# Patient Record
Sex: FEMALE | Race: WHITE | URBAN - METROPOLITAN AREA
[De-identification: names, ages, dates, MRNs, and addresses within clinical notes are randomized per-mention and may not be internally consistent; named-entity substitution may affect disease eponyms.]

---

## 2017-12-08 ENCOUNTER — HOSPITAL ENCOUNTER (OUTPATIENT)
Dept: SURGERY | Age: 42
Discharge: HOME | End: 2017-12-08
Attending: PODIATRIST | Admitting: PODIATRIST

## 2017-12-08 VITALS
HEART RATE: 52 BPM | SYSTOLIC BLOOD PRESSURE: 117 MMHG | DIASTOLIC BLOOD PRESSURE: 70 MMHG | OXYGEN SATURATION: 99 % | BODY MASS INDEX: 24.59 KG/M2 | WEIGHT: 166 LBS | TEMPERATURE: 97.3 F | RESPIRATION RATE: 18 BRPM | HEIGHT: 69 IN

## 2017-12-08 DIAGNOSIS — M21.612 BUNION, LEFT FOOT: ICD-10-CM

## 2017-12-08 LAB — PREGNANCY, URINE: NEGATIVE

## 2017-12-08 RX ORDER — HYDROCODONE BITARTRATE AND ACETAMINOPHEN 5; 325 MG/1; MG/1
1 TABLET ORAL
Status: ACTIVE | OUTPATIENT
Start: 2017-12-08 | End: 2017-12-08

## 2017-12-08 RX ORDER — LABETALOL HYDROCHLORIDE 5 MG/ML
5 INJECTION, SOLUTION INTRAVENOUS EVERY 10 MIN PRN
Status: DISCONTINUED | OUTPATIENT
Start: 2017-12-08 | End: 2017-12-09 | Stop reason: HOSPADM

## 2017-12-08 RX ORDER — SODIUM CHLORIDE 0.9 % (FLUSH) 0.9 %
10 SYRINGE (ML) INJECTION PRN
Status: DISCONTINUED | OUTPATIENT
Start: 2017-12-08 | End: 2017-12-09 | Stop reason: HOSPADM

## 2017-12-08 RX ORDER — PROMETHAZINE HYDROCHLORIDE 25 MG/ML
6.25 INJECTION, SOLUTION INTRAMUSCULAR; INTRAVENOUS
Status: ACTIVE | OUTPATIENT
Start: 2017-12-08 | End: 2017-12-08

## 2017-12-08 RX ORDER — SODIUM CHLORIDE 0.9 % (FLUSH) 0.9 %
10 SYRINGE (ML) INJECTION EVERY 12 HOURS SCHEDULED
Status: DISCONTINUED | OUTPATIENT
Start: 2017-12-08 | End: 2017-12-09 | Stop reason: HOSPADM

## 2017-12-08 RX ORDER — FENTANYL CITRATE 50 UG/ML
25 INJECTION, SOLUTION INTRAMUSCULAR; INTRAVENOUS EVERY 5 MIN PRN
Status: DISCONTINUED | OUTPATIENT
Start: 2017-12-08 | End: 2017-12-09 | Stop reason: HOSPADM

## 2017-12-08 RX ORDER — HYDROMORPHONE HCL 110MG/55ML
0.5 PATIENT CONTROLLED ANALGESIA SYRINGE INTRAVENOUS EVERY 5 MIN PRN
Status: DISCONTINUED | OUTPATIENT
Start: 2017-12-08 | End: 2017-12-09 | Stop reason: HOSPADM

## 2017-12-08 RX ORDER — SODIUM CHLORIDE 9 MG/ML
INJECTION, SOLUTION INTRAVENOUS CONTINUOUS
Status: DISCONTINUED | OUTPATIENT
Start: 2017-12-08 | End: 2017-12-09 | Stop reason: HOSPADM

## 2017-12-08 RX ADMIN — SODIUM CHLORIDE: 9 INJECTION, SOLUTION INTRAVENOUS at 11:47

## 2017-12-08 RX ADMIN — FENTANYL CITRATE 25 MCG: 50 INJECTION, SOLUTION INTRAMUSCULAR; INTRAVENOUS at 15:10

## 2017-12-08 ASSESSMENT — PAIN DESCRIPTION - PROGRESSION
CLINICAL_PROGRESSION: NOT CHANGED
CLINICAL_PROGRESSION: NOT CHANGED

## 2017-12-08 ASSESSMENT — PAIN DESCRIPTION - LOCATION
LOCATION: FOOT
LOCATION: FOOT

## 2017-12-08 ASSESSMENT — PAIN SCALES - GENERAL
PAINLEVEL_OUTOF10: 4
PAINLEVEL_OUTOF10: 5
PAINLEVEL_OUTOF10: 5
PAINLEVEL_OUTOF10: 4

## 2017-12-08 ASSESSMENT — PAIN DESCRIPTION - FREQUENCY
FREQUENCY: CONTINUOUS
FREQUENCY: CONTINUOUS

## 2017-12-08 ASSESSMENT — PAIN DESCRIPTION - PAIN TYPE
TYPE: SURGICAL PAIN
TYPE: SURGICAL PAIN

## 2017-12-08 ASSESSMENT — PAIN DESCRIPTION - DESCRIPTORS
DESCRIPTORS: ACHING
DESCRIPTORS: ACHING

## 2017-12-08 ASSESSMENT — PAIN DESCRIPTION - ORIENTATION
ORIENTATION: LEFT
ORIENTATION: LEFT

## 2017-12-08 NOTE — ANESTHESIA PRE-OP
[facility] Department of Anesthesiology  Pre-Anesthesia Evaluation/Consultation       Name:  Marquez Graham  : 1975  Age:  42 y.o. MRN:  [id number]  Date: 2017           Procedure (Scheduled): Modified deon akin osteotomy first metatarsal left foot, distal metatarsal osteotomy second metatarsal left foot, tailors bunionectomy left foot  Surgeon:  Dr. Cuellar     No Known Allergies  There is no problem list on file for this patient. Past Medical History:   Diagnosis Date    Anxiety     Depression     GERD (gastroesophageal reflux disease)     PAC (premature atrial contraction)     PVC (premature ventricular contraction)      Past Surgical History:   Procedure Laterality Date    BREAST SURGERY      implants    ENDOSCOPY, COLON, DIAGNOSTIC      HERNIA REPAIR      umbilical     Social History   Substance Use Topics    Smoking status: Never Smoker    Smokeless tobacco: Never Used    Alcohol use 2.4 oz/week     4 Glasses of wine per week     Medications  Current Outpatient Prescriptions on File Prior to Encounter   Medication Sig Dispense Refill    clonazePAM (KLONOPIN) 0.5 MG tablet Take 0.5 mg by mouth nightly as needed .  omeprazole (PRILOSEC) 20 MG delayed release capsule Take 20 mg by mouth daily      sertraline (ZOLOFT) 100 MG tablet Take 100 mg by mouth daily      vitamin D 1000 units CAPS Take 2,000 Units by mouth      b complex vitamins capsule Take 1 capsule by mouth daily       No current facility-administered medications on file prior to encounter. Current Outpatient Prescriptions   Medication Sig Dispense Refill    clonazePAM (KLONOPIN) 0.5 MG tablet Take 0.5 mg by mouth nightly as needed .       omeprazole (PRILOSEC) 20 MG delayed release capsule Take 20 mg by mouth daily      sertraline (ZOLOFT) 100 MG tablet Take 100 mg by mouth daily      vitamin D 1000 units CAPS Take 2,000 Units by mouth      b complex vitamins capsule Take 1 capsule by mouth daily       Current Facility-Administered Medications   Medication Dose Route Frequency Provider Last Rate Last Dose    0.9 % sodium chloride infusion   Intravenous Continuous Karishma Bruce MD        sodium chloride flush 0.9 % injection 10 mL  10 mL Intravenous 2 times per day Karishma Bruce MD        sodium chloride flush 0.9 % injection 10 mL  10 mL Intravenous PRN Karishma Bruce MD        famotidine (PEPCID) injection 20 mg  20 mg Intravenous Once Karishma Bruce MD        ceFAZolin (ANCEF) 2 g in sterile water 20 mL IV syringe  2 g Intravenous Once [place], [place]         Vital Signs (Current) There were no vitals filed for this visit. Vital Signs Statistics (for past 48 hrs)     No Data Recorded    BP Readings from Last 3 Encounters:   No data found for BP     BMI  There is no height or weight on file to calculate BMI. Estimated body mass index is 24.51 kg/m² as calculated from the following:    Height as of 12/7/17: 5' 9\" (1.753 m). Weight as of 12/7/17: 166 lb (75.3 kg). CBC No results found for: WBC, RBC, HGB, HCT, MCV, RDW, PLT  CMP  No results found for: NA, K, CL, CO2, BUN, CREATININE, GFRAA, AGRATIO, LABGLOM, GLUCOSE, PROT, CALCIUM, BILITOT, ALKPHOS, AST, ALT  BMP  No results found for: NA, K, CL, CO2, BUN, CREATININE, CALCIUM, GFRAA, LABGLOM, GLUCOSE  POCGlucose  No results for input(s): GLUCOSE in the last 72 hours.    Coags  No results found for: PROTIME, INR, APTT  HCG (If Applicable) No results found for: PREGTESTUR, PREGSERUM, HCG, HCGQUANT   ABGs No results found for: PHART, PO2ART, NXL1TNC, XHH4BZW, BEART, F0PTVGCW   Type & Screen (If Applicable)  No results found for: LABABO, LABRH                         BMI: Wt Readings from Last 3 Encounters:       NPO Status:8 hours                          Anesthesia Evaluation  Patient summary reviewed no history of anesthetic complications:   Airway: Mallampati: II  TM distance: >3 FB   Neck ROM: full   Dental: (+) implants      Pulmonary:Negative Pulmonary ROS and normal exam                               Cardiovascular:Negative CV ROS  Exercise tolerance: good (>4 METS),           Rhythm: regular  Rate: normal           Beta Blocker:  Not on Beta Blocker         Neuro/Psych:   (+) psychiatric history:            GI/Hepatic/Renal:   (+) GERD:,           Endo/Other: Negative Endo/Other ROS                    Abdominal:           Vascular: negative vascular ROS. Anesthesia Plan      MAC     ASA 2       Induction: intravenous. MIPS: Postoperative opioids intended and Prophylactic antiemetics administered. Anesthetic plan and risks discussed with patient and spouse. Plan discussed with CRNA. This pre-anesthesia assessment may be used as a history and physical.    DOS STAFF ADDENDUM:    Pt seen and examined, chart reviewed (including anesthesia, drug and allergy history). No interval changes to history and physical examination. Anesthetic plan, risks, benefits, alternatives, and personnel involved discussed with patient. Patient verbalized an understanding and agrees to proceed.       Lionel Shelley MD  December 8, 2017  11:20 AM

## 2017-12-08 NOTE — PROGRESS NOTES
Tolerating oral intake and sitting on side of bed. Left foot X-ray completed. Discharge instructions given to patient and . Verbalize understanding. Left foot placed in post op boot.

## 2017-12-08 NOTE — ANESTHESIA POST-OP
[facility] Department of Anesthesiology  Post-Anesthesia Note       Name:  Milad Arriaga                                         Age:  42 y.o.   MRN:  [id number]     Last Vitals & Oxygen Saturation: /70   Pulse 52   Temp 97.3 °F (36.3 °C) (Temporal)   Resp 18   Ht 5' 9\" (1.753 m)   Wt 166 lb (75.3 kg)   LMP 11/30/2017 (Exact Date)   SpO2 99%   BMI 24.51 kg/m²   Patient Vitals for the past 4 hrs:   BP Temp Temp src Pulse Resp SpO2   12/08/17 1630 117/70 - - 52 18 99 %   12/08/17 1546 118/75 97.3 °F (36.3 °C) Temporal (!) 49 16 96 %   12/08/17 1530 (!) 111/90 - - (!) 42 15 100 %   12/08/17 1523 127/74 97 °F (36.1 °C) Temporal 62 17 99 %   12/08/17 1512 114/71 - - 50 17 100 %   12/08/17 1506 120/75 - - (!) 42 15 100 %   12/08/17 1500 126/73 - - (!) 40 14 100 %   12/08/17 1447 115/78 97.4 °F (36.3 °C) Temporal - - -       Level of consciousness: awake, alert and oriented    Respiratory: stable     Cardiovascular: stable     Hydration: stable     PONV: stable     Post-op pain: adequate analgesia    Post-op assessment: no apparent anesthetic complications    Complications:  none    Julisa Ga MD  December 8, 2017   4:35 PM

## 2017-12-08 NOTE — OP NOTE
Marquez Graham  YOB: 1975  MRN: [id number]  Surgical Date: 12/8/2017  Surgeon: Lorin Richards    Pre-operative Diagnosis:   1. Bunion deformity, left foot  2. Metatarsalgia, left 2nd metatarsal  3. Tailor's bunion deformity, left foot    Post-operative Diagnosis: Same    Procedure:   1. Scar bunionectomy, left foot  2. Distal metatarsal osteotomy, left 2nd metatarsal  3. Reverse Scar bunionectomy, left 5th metatarsal    Findings: Healthy bleeding bone and tissue    Anesthesia: MAC with left ankle block consisting of 20 cc of 1% xylocaine plain    Hemostasis:  Pneumatic ankle left foot at 250 mmHg    Estimated Blood Loss: Minimal    Materials:   1. Vicryl 3-0, Ethilon 4-0, PDS 2-0  2. Cannulated screw 2.0 x 16 mm  3. Orthosorb 2.0 mm pins x 2    Injectables: Bupivacaine 0.5% without epinephrine to left foot    Complications: none    Specimens: Bone from left 1st metatarsal head sent to pathology for gross examination    Drains/Lines:  none    INDICATIONS FOR PROCEDURE: This patient presented to my office complaining of left foot pain of several years duration. Patient's signs and symptoms were clinically consistent with the above mentioned preoperative diagnosis. Patient had tried shoe gear adjustments, padding devices, and NSAIDs but had only minor and temporary relief of pain. Having failed conservative treatment, it was determined that the patient may benefit from surgical intervention. Discussed bunionectomy, 2nd metatarsal osteotomy, and Tailor's bunionectomy of the left foot with the patient in detail. The potential risks, benefits, complications, and alternatives to the surgical procedure were discussed with the patient prior to the scheduling of surgery. The patient elected to proceed with surgery. Informed consent was reviewed with the patient, signed and placed in patient's chart.      DETAILS OF PROCEDURE:   Patient was seen in the preoperative holding area on a stretcher where IV access was established and 2 grams IV Ancef given 30 minutes prior to surgery. The patient was given the opportunity to ask questions concerning the surgery. All of the patient's questions were answered to the patient's satisfaction. I marked the left foot as the correct operative site. The patient was transported to the operating room and placed on the operating room table in the supine position. Monitored anesthesia care was initiated and a left ankle block consisting of 20 cc of 1% xylocaine plain was administered. A well-padded ankle tourniquet was applied to the left ankle. The left foot and ankle were prepped and draped in the usual sterile manner. A time out was performed to ensure the correct patient, procedure, and operative site. An Esmarch was used to exsanguinate The left foot, and the ankle tourniquet was inflated to 250 mmHg. Attention was directed to the left first metatarsophalangeal joint. A marker was used to draw an approximately 6 cm incision line on the medial aspect of the 1st metatarsal. The line was drawn parallel to the 1st metatarsal from the midshaft of the metatarsal to the midshaft of the 1st proximal phanlanx. A skin incision was made through the previously drawn line. The incision was deepened for its entirety using a combination of blunt and sharp dissection to expose the 1st metatarsophanlangeal joint capsule. A \"Y-V\" capsulotomy was performed on the medial aspect of the capsule, exposing the 1st metatarsophalangeal joint. A moderate size exostosis was noted on the medial aspect of the first metatarsal head. The exostosis was resected with a sagittal saw, taking care to remove more bone dorsally than plantarly and more bone distally than proximally.  A marking pen was used to draw the proposed chevron osteotomy on the medial aspect of the first metatarsal.  The apex of the osteotomy was placed about 2 mm distal to the center of the first metatarsal head with metatarsal.  The apex of the osteotomy was placed about 2 mm distal to the center of the fifth metatarsal head with two arms of equal lengths forming an approximately 60 degree angle. A sagittal saw was used to make a through-and-through osteotomy following the previously marked lines. The capital fragment was moved medially approximately 4 mm and impacted upon the shaft of the fifth metatarsal. Attempted to place a 2.0 cannulated screw across the osteotomy site via standard AO technique, however, the screw cracked the lateral aspect of the dorsal cortex of the capital fragment. A decision was to place a 2.0 Orthosorb pin down the head of the 5th metatarsal across the osteotomy site. A K-wire from the OrthoSorb pin set was driven across the osteotomy site from a dorsal distal 5th metatarsal head to a plantar proximal direction. The redundant exostosis on the medial aspect of the 1st metatarsal was resected with a sagittal saw. The K-wire was removed and an Orthosorb pin was placed into the hole made by the K-wire. The osteotomy site was tested and noted to be securely fixated. Attention was directed to the dorsal aspect of the left 2nd metatarsophalangeal joint. A marker was used to draw an approximately 3 cm linear line on the dorsal aspect of the 2nd metatarsal extending from the base of the 2nd proximal phalanx distally and down the metatarsal proximally. The line was made parallel and centered over the extensor tendon. A skin incision was made through the previously drawn line. The incision was deepened for its entirety using a combination of blunt and sharp dissection to expose the extensor tendon. The the extensor oliver was released and the extensor tendon was safely moved aside with a Katie Lies.  A T-shape capsulotomy was performed to exposed the 2nd metatarsophalangeal joint, with the horizontal incision across the 2nd metatarsophanlangeal joint and the linear incision centered and parallel to the 2nd metatarsal. A sagittal saw was used to make a through and through cut across the 2nd metatarsal from the metatarsal head and down the distal shaft of the metatarsal. The cut was made approximately 3 mm plantar to the most dorsal aspect of the articular cartilage of the 2nd metatarsal head. The osteotomy site was temporary fixated with a K-wire from the 2.0 cannulated screw set. The wire was angulated slightly from dorsal proximal to distal plantar. A 2.0 x 16 mm cannulated screw was place across the osteotomy site using standard AO technique. The osteotomy site was tested and was noted to be firmly fixated. A mini C-arm was utilized during surgery to help ensure the proper placement of fixation devices for the above procedures. The surgical sites were flushed with copious amounts of sterile normal saline. The joint capsule of the 1st metatarsophalangeal joint was repaired with the hallux maintained in the corrected position. The plantar and dorsal aspects of the capsule was repaired first with figure-of-eight and simple sutures of 3-0 PDS. The V-shaped capsule flap was reapproximated  with simple sutures of 3-0 PDS. Running suture of 5-0 monocryl were used to reapproximate the subcutaneous layer of all incision sites. Suture of 4-0 nylon was used to reapproximate the skin of all incision sites. The incision sites were cleansed with saline soaked gauze and dried. The incision site was dressed with Betadine soaked Adaptic followed by 4 x 4's, fluffs, Kerlix and an Ace wrap for mild compression. The ankle tourniquet was deflated. The patient's vascular status was checked and found to be adequate. The patient tolerated anesthesia and the procedure well and was transferred to the post anesthesia care unit with vital signs stable and vascular status intact. The patient was placed under the care of the recovery room staff until ready for discharge to home per Anesthesia.  The patient will follow up with

## 2017-12-08 NOTE — INTERVAL H&P NOTE
I reviewed the patient's History and Physical performed on 11/17/2017 by Dr. Billie Costello.  Patient received medical clearance for outpatient surgery scheduled on 12/08/2017 at [facility].

## 2018-05-22 ENCOUNTER — HOSPITAL ENCOUNTER (OUTPATIENT)
Dept: GENERAL RADIOLOGY | Age: 43
Discharge: HOME | End: 2018-05-22
Attending: INTERNAL MEDICINE | Admitting: INTERNAL MEDICINE

## 2018-05-22 DIAGNOSIS — K21.9 GASTROESOPHAGEAL REFLUX DISEASE, ESOPHAGITIS PRESENCE NOT SPECIFIED: ICD-10-CM

## 2019-05-03 NOTE — PROGRESS NOTES
Received from PACU. Admitted to Phase 2 care. Awake and alert, respirations easy and even. Oriented to room and surroundings. States pain is tolerable presently. denies pain/discomfort

## 2024-09-05 RX ORDER — CHLORAL HYDRATE 500 MG
CAPSULE ORAL DAILY
COMMUNITY

## 2024-09-05 RX ORDER — LISINOPRIL 5 MG/1
5 TABLET ORAL DAILY
COMMUNITY

## 2024-09-05 RX ORDER — HYDROCHLOROTHIAZIDE 25 MG/1
25 TABLET ORAL DAILY
COMMUNITY

## 2024-09-05 RX ORDER — DULOXETIN HYDROCHLORIDE 60 MG/1
60 CAPSULE, DELAYED RELEASE ORAL DAILY
COMMUNITY

## 2024-09-09 ENCOUNTER — ANESTHESIA EVENT (OUTPATIENT)
Dept: ENDOSCOPY | Age: 49
End: 2024-09-09
Payer: COMMERCIAL

## 2024-09-13 ENCOUNTER — HOSPITAL ENCOUNTER (OUTPATIENT)
Age: 49
Setting detail: OUTPATIENT SURGERY
Discharge: HOME | End: 2024-09-13
Attending: INTERNAL MEDICINE | Admitting: INTERNAL MEDICINE
Payer: COMMERCIAL

## 2024-09-13 ENCOUNTER — ANESTHESIA (OUTPATIENT)
Dept: ENDOSCOPY | Age: 49
End: 2024-09-13
Payer: COMMERCIAL

## 2024-09-13 VITALS
RESPIRATION RATE: 16 BRPM | TEMPERATURE: 96.5 F | SYSTOLIC BLOOD PRESSURE: 103 MMHG | HEIGHT: 69 IN | BODY MASS INDEX: 27.11 KG/M2 | WEIGHT: 183 LBS | HEART RATE: 62 BPM | OXYGEN SATURATION: 100 % | DIASTOLIC BLOOD PRESSURE: 59 MMHG

## 2024-09-13 DIAGNOSIS — Z86.010 HISTORY OF COLON POLYPS: ICD-10-CM

## 2024-09-13 PROCEDURE — 2580000003 HC RX 258: Performed by: STUDENT IN AN ORGANIZED HEALTH CARE EDUCATION/TRAINING PROGRAM

## 2024-09-13 PROCEDURE — 7100000010 HC PHASE II RECOVERY - FIRST 15 MIN: Performed by: INTERNAL MEDICINE

## 2024-09-13 PROCEDURE — 3700000000 HC ANESTHESIA ATTENDED CARE: Performed by: INTERNAL MEDICINE

## 2024-09-13 PROCEDURE — 6360000002 HC RX W HCPCS: Performed by: NURSE ANESTHETIST, CERTIFIED REGISTERED

## 2024-09-13 PROCEDURE — 88305 TISSUE EXAM BY PATHOLOGIST: CPT

## 2024-09-13 PROCEDURE — 2709999900 HC NON-CHARGEABLE SUPPLY: Performed by: INTERNAL MEDICINE

## 2024-09-13 PROCEDURE — 3700000001 HC ADD 15 MINUTES (ANESTHESIA): Performed by: INTERNAL MEDICINE

## 2024-09-13 PROCEDURE — 7100000011 HC PHASE II RECOVERY - ADDTL 15 MIN: Performed by: INTERNAL MEDICINE

## 2024-09-13 PROCEDURE — 3609010600 HC COLONOSCOPY POLYPECTOMY SNARE/COLD BIOPSY: Performed by: INTERNAL MEDICINE

## 2024-09-13 RX ORDER — SODIUM CHLORIDE 9 MG/ML
INJECTION, SOLUTION INTRAVENOUS PRN
Status: DISCONTINUED | OUTPATIENT
Start: 2024-09-13 | End: 2024-09-13 | Stop reason: HOSPADM

## 2024-09-13 RX ORDER — IPRATROPIUM BROMIDE AND ALBUTEROL SULFATE 2.5; .5 MG/3ML; MG/3ML
1 SOLUTION RESPIRATORY (INHALATION)
Status: DISCONTINUED | OUTPATIENT
Start: 2024-09-13 | End: 2024-09-13 | Stop reason: HOSPADM

## 2024-09-13 RX ORDER — SODIUM CHLORIDE 0.9 % (FLUSH) 0.9 %
5-40 SYRINGE (ML) INJECTION EVERY 12 HOURS SCHEDULED
Status: DISCONTINUED | OUTPATIENT
Start: 2024-09-13 | End: 2024-09-13 | Stop reason: HOSPADM

## 2024-09-13 RX ORDER — PROPOFOL 10 MG/ML
INJECTION, EMULSION INTRAVENOUS
Status: DISCONTINUED | OUTPATIENT
Start: 2024-09-13 | End: 2024-09-13 | Stop reason: SDUPTHER

## 2024-09-13 RX ORDER — SODIUM CHLORIDE 0.9 % (FLUSH) 0.9 %
5-40 SYRINGE (ML) INJECTION PRN
Status: DISCONTINUED | OUTPATIENT
Start: 2024-09-13 | End: 2024-09-13 | Stop reason: HOSPADM

## 2024-09-13 RX ADMIN — PROPOFOL 100 MG: 10 INJECTION, EMULSION INTRAVENOUS at 09:43

## 2024-09-13 RX ADMIN — PROPOFOL 50 MG: 10 INJECTION, EMULSION INTRAVENOUS at 10:01

## 2024-09-13 RX ADMIN — SODIUM CHLORIDE: 9 INJECTION, SOLUTION INTRAVENOUS at 08:55

## 2024-09-13 RX ADMIN — PROPOFOL 100 MG: 10 INJECTION, EMULSION INTRAVENOUS at 09:47

## 2024-09-13 RX ADMIN — PROPOFOL 100 MG: 10 INJECTION, EMULSION INTRAVENOUS at 09:38

## 2024-09-13 ASSESSMENT — PAIN - FUNCTIONAL ASSESSMENT
PAIN_FUNCTIONAL_ASSESSMENT: 0-10
PAIN_FUNCTIONAL_ASSESSMENT: 0-10

## (undated) DEVICE — SNARE VASC L240CM LOOP W10MM SHTH DIA2.4MM RND STIFF CLD